# Patient Record
Sex: FEMALE | Race: WHITE | NOT HISPANIC OR LATINO | Employment: FULL TIME | RURAL
[De-identification: names, ages, dates, MRNs, and addresses within clinical notes are randomized per-mention and may not be internally consistent; named-entity substitution may affect disease eponyms.]

---

## 2021-01-13 ENCOUNTER — TELEPHONE (OUTPATIENT)
Dept: URGENT CARE | Facility: CLINIC | Age: 46
End: 2021-01-13

## 2021-01-13 DIAGNOSIS — R05.9 COUGH: Primary | ICD-10-CM

## 2021-01-13 RX ORDER — BROMPHENIRAMINE MALEATE, PSEUDOEPHEDRINE HYDROCHLORIDE, AND DEXTROMETHORPHAN HYDROBROMIDE 2; 30; 10 MG/5ML; MG/5ML; MG/5ML
5 SYRUP ORAL 4 TIMES DAILY PRN
Qty: 118 ML | Refills: 0 | Status: SHIPPED | OUTPATIENT
Start: 2021-01-13 | End: 2021-01-18

## 2021-01-13 NOTE — TELEPHONE ENCOUNTER
Patient states that Sudafed was not available at her pharmacy.  Requesting new medication at this time for cough and nasal congestion.  Sent in Bromfed as needed at this time, notified patient's at her pharmacy.

## 2021-01-14 ENCOUNTER — TELEPHONE (OUTPATIENT)
Dept: URGENT CARE | Facility: CLINIC | Age: 46
End: 2021-01-14

## 2021-03-02 ENCOUNTER — TELEPHONE (OUTPATIENT)
Dept: URGENT CARE | Facility: CLINIC | Age: 46
End: 2021-03-02

## 2021-03-02 NOTE — TELEPHONE ENCOUNTER
Patient called wanting a copy of x-rays she says were done here at [facility] late Oct/ early Nov 2020. Says she has now seen Dr Rao as a PCP with continuing issues with hand. Says they told her her hand was never broken. She is requesting a copy of the x-ray and how to procede with treatment. I even had the RT on staff today to help me locate the x-rays and none can be found. The only date of service for this patient is 1/2021 for covid/flu symptoms.